# Patient Record
(demographics unavailable — no encounter records)

---

## 2024-12-16 NOTE — HISTORY OF PRESENT ILLNESS
[FreeTextEntry1] : 83 year old woman with a history of hypothyroidism, morbidly obese HTN and DL presenting for follow up. Patient initially seen in August for chronic LE edema and underwent echo, venous duplex, and bloodwork. She is here for follow up. Overall doing well, not compliant with compression and elevation.   Data reviewed today:  TTE: Normal LV systolic and diastolic function. Mild MR. Mild TR.  Labs: A1C 5.6%, TSH 0.8, U/A with no proteinuria  Venous Duplex: Veins are compressible. No evidence of RLE or LLE DVT's. Some reflux noted in R small and L Great saphenous veins.

## 2024-12-16 NOTE — ASSESSMENT
[FreeTextEntry1] : #Chronic LE edema (CEAP IV) - improved   likely multifactorial secondary to obesity, poor venous drainage with evidence of segmental venous reflux #Mild BRIA with hypopnea   Plan: - Compression stockings - Continue with low-salt diet - Will consider venous ablation if no improvement - C/w ACEI/thiazide diuretic for HTN control - 6 month f/u

## 2024-12-16 NOTE — PHYSICAL EXAM
[Well Developed] : well developed [Soft] : abdomen soft [Non Tender] : non-tender [de-identified] : B/L LE edema 3+

## 2024-12-16 NOTE — PHYSICAL EXAM
[Well Developed] : well developed [Soft] : abdomen soft [Non Tender] : non-tender [de-identified] : B/L LE edema 3+